# Patient Record
Sex: FEMALE | Race: WHITE | NOT HISPANIC OR LATINO | URBAN - METROPOLITAN AREA
[De-identification: names, ages, dates, MRNs, and addresses within clinical notes are randomized per-mention and may not be internally consistent; named-entity substitution may affect disease eponyms.]

---

## 2018-08-10 ENCOUNTER — OFFICE VISIT - RIVER FALLS (OUTPATIENT)
Dept: FAMILY MEDICINE | Facility: CLINIC | Age: 58
End: 2018-08-10

## 2022-02-11 VITALS
BODY MASS INDEX: 22.19 KG/M2 | SYSTOLIC BLOOD PRESSURE: 103 MMHG | DIASTOLIC BLOOD PRESSURE: 66 MMHG | HEART RATE: 71 BPM | TEMPERATURE: 98.7 F | WEIGHT: 113.6 LBS

## 2022-02-16 NOTE — PROGRESS NOTES
Patient:   TAMMIE ROSARIO            MRN: [id number]            FIN: [id number]               Age:   58 years     Sex:  Female     :  1960   Associated Diagnoses:   Ankle sprain   Author:   Noel Rooney MD      Visit Information      Date of Service: 08/10/2018 04:40 pm  Performing Location: [facility]  Encounter#: [id number]      Chief Complaint   8/10/2018 4:41 PM CDT    Twisted left ankle.        History of Present Illness   chief complaint and symptoms as noted above confirmed with patient   just happened  rolled ankle  Slight pain      Review of Systems   Constitutional:  Negative except as documented in history of present illness.    Integumentary:  Negative.    Neurologic:  Negative.       Health Status   Allergies:    Allergic Reactions (Selected)  Severity Not Documented  Sulfa drugs (Rash)   Medications:  (Selected)   Documented Medications  Documented  CALCIUM 600+D (b87856): 1 tab(s), po, daily, 0 Refill(s)  METRONIDAZOLE 0.75% TOPICAL GEL (f06638): 1 blake, top, # 28.4, 1 Refill(s)  MULTIPLE VITAMINS ORAL TABLET (d37040): 1 tab(s), po, 0 Refill(s),    Medications          *denotes recorded medication          *CALCIUM 600+D (p77260): 1 tab(s), po, daily.          *METRONIDAZOLE 0.75% TOPICAL GEL (j52988): 1 blake, top, 28.4.          *MULTIPLE VITAMINS ORAL TABLET (u99976): 1 tab(s), po.        Physical Examination   Vital Signs   8/10/2018 4:41 PM CDT Temperature Tympanic 98.7 DegF    Peripheral Pulse Rate 71 bpm    Systolic Blood Pressure 103 mmHg    Diastolic Blood Pressure 66 mmHg    Mean Arterial Pressure 78 mmHg      Measurements from flowsheet : Measurements   8/10/2018 4:41 PM CDT    Weight Measured - Standard                113.6 lb     General:  Alert and oriented, No acute distress.    Musculoskeletal:       Lower extremity exam: Ankle ( Left, Anterior, Lateral, Talofibular, No deformity, No erythema, No ecchymosis, No swelling, Tenderness, Normal range of motion, neg  alice , cms otherwise intact ).    Neurologic:  Alert, Oriented.       Impression and Plan   Diagnosis     Ankle sprain ([id number] S93.409A).     Course:  Well controlled.    Plan:  Rest, Ice, Compression and Elevation, air cast  fu 1 week if not better sooner if worse.    Patient Instructions:       Counseled: Patient, Regarding treatment, Regarding diagnosis, Activity.